# Patient Record
Sex: FEMALE | Race: WHITE
[De-identification: names, ages, dates, MRNs, and addresses within clinical notes are randomized per-mention and may not be internally consistent; named-entity substitution may affect disease eponyms.]

---

## 2023-07-19 ENCOUNTER — HOSPITAL ENCOUNTER
Dept: HOSPITAL 101 - RAD | Age: 68
Discharge: HOME | End: 2023-07-19
Payer: MEDICARE

## 2023-07-19 DIAGNOSIS — S92.351D: Primary | ICD-10-CM

## 2023-07-19 PROCEDURE — 73630 X-RAY EXAM OF FOOT: CPT

## 2023-07-25 ENCOUNTER — HOSPITAL ENCOUNTER
Dept: HOSPITAL 101 - MRI | Age: 68
Discharge: HOME | End: 2023-07-25
Payer: MEDICARE

## 2023-07-25 DIAGNOSIS — M76.71: Primary | ICD-10-CM

## 2023-07-25 DIAGNOSIS — M25.9: ICD-10-CM
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
## 2023-07-25 DIAGNOSIS — M76.821: ICD-10-CM

## 2023-07-25 DIAGNOSIS — S93.421A: ICD-10-CM

## 2023-07-25 DIAGNOSIS — S93.431A: ICD-10-CM

## 2023-07-25 DIAGNOSIS — Z96.7: ICD-10-CM

## 2023-07-25 PROCEDURE — 73721 MRI JNT OF LWR EXTRE W/O DYE: CPT

## 2023-08-16 ENCOUNTER — HOSPITAL ENCOUNTER (OUTPATIENT)
Dept: HOSPITAL 101 - PT | Age: 68
LOS: 44 days | Discharge: HOME | End: 2023-09-29
Payer: MEDICARE

## 2023-08-16 DIAGNOSIS — M25.571: ICD-10-CM

## 2023-08-16 DIAGNOSIS — S92.351D: Primary | ICD-10-CM

## 2023-08-16 PROCEDURE — 97010 HOT OR COLD PACKS THERAPY: CPT

## 2023-08-16 PROCEDURE — G0283 ELEC STIM OTHER THAN WOUND: HCPCS

## 2023-08-16 PROCEDURE — 97530 THERAPEUTIC ACTIVITIES: CPT

## 2023-08-16 PROCEDURE — 97112 NEUROMUSCULAR REEDUCATION: CPT

## 2023-08-16 PROCEDURE — 97110 THERAPEUTIC EXERCISES: CPT

## 2023-08-16 PROCEDURE — 97140 MANUAL THERAPY 1/> REGIONS: CPT

## 2023-08-16 PROCEDURE — 97162 PT EVAL MOD COMPLEX 30 MIN: CPT

## 2023-10-08 ENCOUNTER — HOSPITAL ENCOUNTER (EMERGENCY)
Age: 68
Discharge: HOME | End: 2023-10-08
Payer: MEDICARE

## 2023-10-08 VITALS — BODY MASS INDEX: 20.3 KG/M2

## 2023-10-08 VITALS
TEMPERATURE: 98.06 F | DIASTOLIC BLOOD PRESSURE: 84 MMHG | RESPIRATION RATE: 18 BRPM | HEART RATE: 90 BPM | OXYGEN SATURATION: 99 % | SYSTOLIC BLOOD PRESSURE: 148 MMHG

## 2023-10-08 DIAGNOSIS — Z79.899: ICD-10-CM

## 2023-10-08 DIAGNOSIS — T46.5X5A: ICD-10-CM

## 2023-10-08 DIAGNOSIS — R05.3: Primary | ICD-10-CM

## 2023-10-08 PROCEDURE — 71045 X-RAY EXAM CHEST 1 VIEW: CPT

## 2023-10-08 PROCEDURE — 99284 EMERGENCY DEPT VISIT MOD MDM: CPT

## 2023-10-08 PROCEDURE — 96372 THER/PROPH/DIAG INJ SC/IM: CPT

## 2023-11-15 ENCOUNTER — HOSPITAL ENCOUNTER
Dept: HOSPITAL 101 - MAMMO | Age: 68
Discharge: HOME | End: 2023-11-15
Payer: MEDICARE

## 2023-11-15 DIAGNOSIS — I10: ICD-10-CM

## 2023-11-15 DIAGNOSIS — Z80.8: ICD-10-CM

## 2023-11-15 DIAGNOSIS — Z12.31: Primary | ICD-10-CM

## 2023-11-15 DIAGNOSIS — Z80.3: ICD-10-CM

## 2023-11-15 DIAGNOSIS — Z78.0: ICD-10-CM

## 2023-11-15 LAB
ADD MANUAL DIFF: NO
ALANINE AMINOTRANSFERASE: 22 U/L (ref 14–59)
ANION GAP: 11.9
BLOOD UREA NITROGEN: 19 MG/DL (ref 7–18)
CALCIUM: 9 MG/DL (ref 8.5–10.1)
CARBON DIOXIDE: 30.7 MMOL/L (ref 21–32)
CHLORIDE: 104 MMOL/L (ref 98–107)
CHOLESTEROL: 169 MG/DL (ref ?–200)
CO2 BLD-SCNC: 30.7 MMOL/L (ref 21–32)
ESTIMATED GFR (AFRICAN AMERICA: >60 (ref 60–?)
ESTIMATED GFR (NON-AFRICAN AME: >60 (ref 60–?)
GLUCOSE BLD-MCNC: 87 MG/DL (ref 74–106)
HCT VFR BLD CALC: 32.9 % (ref 36–48)
HDL CHOLESTEROL: 60 MG/DL (ref 40–60)
HEMATOCRIT: 32.9 % (ref 36–48)
HEMOGLOBIN: 10 G/DL (ref 12–16)
IMMATURE GRANULOCYTES ABS AUTO: 0.03 10^3/UL (ref 0–0.03)
IMMATURE GRANULOCYTES PCT AUTO: 0.4 % (ref 0–0.5)
LYMPHOCYTES  ABSOLUTE AUTO: 2 10^3/UL (ref 1.2–3.8)
MCV RBC: 83.1 FL (ref 81–99)
MEAN CORPUSCULAR HEMOGLOBIN: 25.3 PG (ref 26.7–34)
MEAN CORPUSCULAR HGB CONC: 30.4 G/DL (ref 29.9–35.2)
MEAN CORPUSCULAR VOLUME: 83.1 FL (ref 81–99)
PLATELET # BLD: 383 10^3/UL (ref 150–450)
PLATELET COUNT: 383 10^3/UL (ref 150–450)
POTASSIUM SERPLBLD-SCNC: 3.6 MMOL/L (ref 3.5–5.1)
POTASSIUM: 3.6 MMOL/L (ref 3.5–5.1)
RED BLOOD COUNT: 3.96 10^6/UL (ref 4.2–5.4)
SODIUM BLD-SCNC: 143 MMOL/L (ref 136–145)
SODIUM: 143 MMOL/L (ref 136–145)
THYROID STIMULATING HORMONE: 1.12 UIU/ML (ref 0.36–3.74)
TRIGLYCERIDES: 103 MG/DL (ref ?–150)
VLDL CHOLESTEROL: 20.6 MG/DL
WBC # BLD: 8.3 10^3/UL (ref 4–11)
WHITE BLOOD COUNT: 8.3 10^3/UL (ref 4–11)

## 2023-11-15 PROCEDURE — 84443 ASSAY THYROID STIM HORMONE: CPT

## 2023-11-15 PROCEDURE — 84460 ALANINE AMINO (ALT) (SGPT): CPT

## 2023-11-15 PROCEDURE — 77067 SCR MAMMO BI INCL CAD: CPT

## 2023-11-15 PROCEDURE — 80048 BASIC METABOLIC PNL TOTAL CA: CPT

## 2023-11-15 PROCEDURE — 82306 VITAMIN D 25 HYDROXY: CPT

## 2023-11-15 PROCEDURE — 77063 BREAST TOMOSYNTHESIS BI: CPT

## 2023-11-15 PROCEDURE — 85025 COMPLETE CBC W/AUTO DIFF WBC: CPT

## 2023-11-15 PROCEDURE — 80061 LIPID PANEL: CPT

## 2023-11-15 PROCEDURE — 36415 COLL VENOUS BLD VENIPUNCTURE: CPT

## 2023-11-21 ENCOUNTER — HOSPITAL ENCOUNTER
Dept: HOSPITAL 101 - RAD | Age: 68
Discharge: HOME | End: 2023-11-21
Payer: MEDICARE

## 2023-11-21 DIAGNOSIS — M85.80: ICD-10-CM

## 2023-11-21 DIAGNOSIS — Z78.0: Primary | ICD-10-CM

## 2023-11-21 PROCEDURE — 77080 DXA BONE DENSITY AXIAL: CPT

## 2023-12-22 ENCOUNTER — HOSPITAL ENCOUNTER
Dept: HOSPITAL 101 - LAB | Age: 68
Discharge: HOME | End: 2023-12-22
Payer: MEDICARE

## 2023-12-22 DIAGNOSIS — D64.9: Primary | ICD-10-CM

## 2023-12-22 LAB
ADD MANUAL DIFF: NO
FERRITIN: 11 NG/ML (ref 8–252)
FOLATE: 25.3 NG/ML (ref 8.6–58.9)
HCT VFR BLD CALC: 32.9 % (ref 36–48)
HEMATOCRIT: 32.9 % (ref 36–48)
HEMOGLOBIN: 9.9 G/DL (ref 12–16)
IMMATURE GRANULOCYTES ABS AUTO: 0.02 10^3/UL (ref 0–0.03)
IMMATURE GRANULOCYTES PCT AUTO: 0.3 % (ref 0–0.5)
IRON: 39 UG/DL (ref 50–170)
LYMPHOCYTES  ABSOLUTE AUTO: 1.9 10^3/UL (ref 1.2–3.8)
MCV RBC: 82.3 FL (ref 81–99)
MEAN CORPUSCULAR HEMOGLOBIN: 24.8 PG (ref 26.7–34)
MEAN CORPUSCULAR HGB CONC: 30.1 G/DL (ref 29.9–35.2)
MEAN CORPUSCULAR VOLUME: 82.3 FL (ref 81–99)
PERCENT IRON SATURATION: 9.1 %
PLATELET # BLD: 376 10^3/UL (ref 150–450)
PLATELET COUNT: 376 10^3/UL (ref 150–450)
RED BLOOD COUNT: 4 10^6/UL (ref 4.2–5.4)
TOTAL IRON BINDING CAPACITY: 429 UG/DL (ref 250–450)
VITAMIN B12: 1305 PG/ML (ref 193–986)
WBC # BLD: 7.6 10^3/UL (ref 4–11)
WHITE BLOOD COUNT: 7.6 10^3/UL (ref 4–11)

## 2023-12-22 PROCEDURE — 36415 COLL VENOUS BLD VENIPUNCTURE: CPT

## 2023-12-22 PROCEDURE — 82607 VITAMIN B-12: CPT

## 2023-12-22 PROCEDURE — 82728 ASSAY OF FERRITIN: CPT

## 2023-12-22 PROCEDURE — 83550 IRON BINDING TEST: CPT

## 2023-12-22 PROCEDURE — 83540 ASSAY OF IRON: CPT

## 2023-12-22 PROCEDURE — 85025 COMPLETE CBC W/AUTO DIFF WBC: CPT

## 2023-12-22 PROCEDURE — 82746 ASSAY OF FOLIC ACID SERUM: CPT

## 2023-12-22 PROCEDURE — 85045 AUTOMATED RETICULOCYTE COUNT: CPT

## 2024-01-25 ENCOUNTER — HOSPITAL ENCOUNTER
Dept: HOSPITAL 101 - LAB | Age: 69
Discharge: HOME | End: 2024-01-25
Payer: MEDICARE

## 2024-01-25 DIAGNOSIS — D50.0: Primary | ICD-10-CM

## 2024-01-25 LAB
ADD MANUAL DIFF: NO
FERRITIN: 14 NG/ML (ref 8–252)
HCT VFR BLD CALC: 32.1 % (ref 36–48)
HEMATOCRIT: 32.1 % (ref 36–48)
HEMOGLOBIN: 9.5 G/DL (ref 12–16)
IMMATURE GRANULOCYTES ABS AUTO: 0.02 10^3/UL (ref 0–0.03)
IMMATURE GRANULOCYTES PCT AUTO: 0.2 % (ref 0–0.5)
LYMPHOCYTES  ABSOLUTE AUTO: 2 10^3/UL (ref 1.2–3.8)
MCV RBC: 82.1 FL (ref 81–99)
MEAN CORPUSCULAR HEMOGLOBIN: 24.3 PG (ref 26.7–34)
MEAN CORPUSCULAR HGB CONC: 29.6 G/DL (ref 29.9–35.2)
MEAN CORPUSCULAR VOLUME: 82.1 FL (ref 81–99)
PLATELET # BLD: 357 10^3/UL (ref 150–450)
PLATELET COUNT: 357 10^3/UL (ref 150–450)
RED BLOOD COUNT: 3.91 10^6/UL (ref 4.2–5.4)
WBC # BLD: 9.2 10^3/UL (ref 4–11)
WHITE BLOOD COUNT: 9.2 10^3/UL (ref 4–11)

## 2024-01-25 PROCEDURE — 85025 COMPLETE CBC W/AUTO DIFF WBC: CPT

## 2024-01-25 PROCEDURE — 82728 ASSAY OF FERRITIN: CPT

## 2024-01-25 PROCEDURE — 36415 COLL VENOUS BLD VENIPUNCTURE: CPT

## 2024-02-14 VITALS
OXYGEN SATURATION: 99 % | HEART RATE: 92 BPM | SYSTOLIC BLOOD PRESSURE: 132 MMHG | RESPIRATION RATE: 16 BRPM | DIASTOLIC BLOOD PRESSURE: 78 MMHG | TEMPERATURE: 96.98 F

## 2024-02-19 LAB — RETICULOCYTE COUNT: 1.25 % (ref 0.6–3.1)

## 2024-02-20 ENCOUNTER — HOSPITAL ENCOUNTER
Dept: HOSPITAL 101 - CT | Age: 69
Discharge: HOME | End: 2024-02-20
Payer: MEDICARE

## 2024-02-20 DIAGNOSIS — R06.1: Primary | ICD-10-CM

## 2024-02-20 DIAGNOSIS — J45.41: ICD-10-CM

## 2024-02-20 PROCEDURE — 94010 BREATHING CAPACITY TEST: CPT

## 2024-02-20 PROCEDURE — 94729 DIFFUSING CAPACITY: CPT

## 2024-02-20 PROCEDURE — 70490 CT SOFT TISSUE NECK W/O DYE: CPT

## 2024-02-20 PROCEDURE — 94726 PLETHYSMOGRAPHY LUNG VOLUMES: CPT

## 2024-02-20 PROCEDURE — 71250 CT THORAX DX C-: CPT

## 2024-02-21 ENCOUNTER — HOSPITAL ENCOUNTER (OUTPATIENT)
Dept: HOSPITAL 101 - INF | Age: 69
LOS: 8 days | Discharge: HOME | End: 2024-02-29
Payer: MEDICARE

## 2024-02-21 VITALS
TEMPERATURE: 97.88 F | DIASTOLIC BLOOD PRESSURE: 74 MMHG | SYSTOLIC BLOOD PRESSURE: 116 MMHG | RESPIRATION RATE: 16 BRPM | OXYGEN SATURATION: 95 % | HEART RATE: 90 BPM

## 2024-02-21 DIAGNOSIS — D50.9: ICD-10-CM

## 2024-02-21 DIAGNOSIS — K90.9: ICD-10-CM

## 2024-02-21 DIAGNOSIS — D64.9: Primary | ICD-10-CM

## 2024-02-21 PROCEDURE — 96365 THER/PROPH/DIAG IV INF INIT: CPT

## 2024-02-21 PROCEDURE — G0463 HOSPITAL OUTPT CLINIC VISIT: HCPCS

## 2024-03-04 ENCOUNTER — HOSPITAL ENCOUNTER
Dept: HOSPITAL 101 - CT | Age: 69
Discharge: HOME | End: 2024-03-04
Payer: MEDICARE

## 2024-03-04 DIAGNOSIS — R10.32: Primary | ICD-10-CM

## 2024-03-04 DIAGNOSIS — K57.90: ICD-10-CM

## 2024-03-04 PROCEDURE — 74176 CT ABD & PELVIS W/O CONTRAST: CPT

## 2024-04-11 ENCOUNTER — HOSPITAL ENCOUNTER
Dept: HOSPITAL 101 - LAB | Age: 69
Discharge: HOME | End: 2024-04-11
Payer: MEDICARE

## 2024-04-11 DIAGNOSIS — D50.9: ICD-10-CM

## 2024-04-11 DIAGNOSIS — K90.9: ICD-10-CM

## 2024-04-11 DIAGNOSIS — D64.9: Primary | ICD-10-CM

## 2024-04-11 LAB
ADD MANUAL DIFF: NO
FERRITIN: 110 NG/ML (ref 8–252)
HEMATOCRIT: 36.9 % (ref 36–48)
HEMOGLOBIN: 12 G/DL (ref 12–16)
IMMATURE GRANULOCYTES ABS AUTO: 0.17 10^3/UL (ref 0–0.03)
IMMATURE GRANULOCYTES PCT AUTO: 1.4 % (ref 0–0.5)
IRON: 58 UG/DL (ref 50–170)
LYMPHOCYTES  ABSOLUTE AUTO: 2.2 10^3/UL (ref 1.2–3.8)
MCV RBC: 87.9 FL (ref 81–99)
MEAN CORPUSCULAR HEMOGLOBIN: 28.6 PG (ref 26.7–34)
MEAN CORPUSCULAR HGB CONC: 32.5 G/DL (ref 29.9–35.2)
PERCENT IRON SATURATION: 20.8 %
PLATELET COUNT: 378 10^3/UL (ref 150–450)
RED BLOOD COUNT: 4.2 10^6/UL (ref 4.2–5.4)
TOTAL IRON BINDING CAPACITY: 279 UG/DL (ref 250–450)
WHITE BLOOD COUNT: 11.8 10^3/UL (ref 4–11)

## 2024-04-11 PROCEDURE — 82728 ASSAY OF FERRITIN: CPT

## 2024-04-11 PROCEDURE — 83550 IRON BINDING TEST: CPT

## 2024-04-11 PROCEDURE — 36415 COLL VENOUS BLD VENIPUNCTURE: CPT

## 2024-04-11 PROCEDURE — 83540 ASSAY OF IRON: CPT

## 2024-04-11 PROCEDURE — 85025 COMPLETE CBC W/AUTO DIFF WBC: CPT

## 2024-04-16 ENCOUNTER — HOSPITAL ENCOUNTER (OUTPATIENT)
Dept: HOSPITAL 101 - INF | Age: 69
LOS: 14 days | Discharge: HOME | End: 2024-04-30
Payer: MEDICARE

## 2024-04-16 DIAGNOSIS — K90.9: ICD-10-CM

## 2024-04-16 DIAGNOSIS — D50.9: Primary | ICD-10-CM

## 2024-04-16 DIAGNOSIS — D64.9: ICD-10-CM

## 2024-04-16 PROCEDURE — 86334 IMMUNOFIX E-PHORESIS SERUM: CPT

## 2024-04-16 PROCEDURE — 82785 ASSAY OF IGE: CPT

## 2024-04-16 PROCEDURE — 84155 ASSAY OF PROTEIN SERUM: CPT

## 2024-04-16 PROCEDURE — 84165 PROTEIN E-PHORESIS SERUM: CPT

## 2024-04-16 PROCEDURE — 36415 COLL VENOUS BLD VENIPUNCTURE: CPT

## 2024-04-16 PROCEDURE — G0463 HOSPITAL OUTPT CLINIC VISIT: HCPCS

## 2024-04-16 PROCEDURE — 83521 IG LIGHT CHAINS FREE EACH: CPT

## 2024-04-16 PROCEDURE — 82784 ASSAY IGA/IGD/IGG/IGM EACH: CPT

## 2024-04-17 LAB
ALBUMIN: 3.6 G/DL (ref 2.9–4.4)
ALPHA-1-GLOBULIN: 0.2 G/DL (ref 0–0.4)
ALPHA-2-GLOBULIN: 0.9 G/DL (ref 0.4–1)
FREE KAPPA LT CHAINS,S: 12.1 MG/L (ref 3.3–19.4)
FREE LAMBDA LT CHAINS,S: 10.7 MG/L (ref 5.7–26.3)
GAMMA GLOBULIN: 0.5 G/DL (ref 0.4–1.8)
IMMUNOGLOBULIN A, QN, SERUM: 83 MG/DL (ref 87–352)
KAPPA/LAMBDA RATIO,S: 1.13 (ref 0.26–1.65)

## 2024-05-08 ENCOUNTER — HOSPITAL ENCOUNTER
Dept: HOSPITAL 101 - EC | Age: 69
Discharge: HOME | End: 2024-05-08
Payer: MEDICARE

## 2024-05-08 DIAGNOSIS — M79.671: Primary | ICD-10-CM

## 2024-05-08 DIAGNOSIS — Z98.890: ICD-10-CM

## 2024-05-08 PROCEDURE — 73630 X-RAY EXAM OF FOOT: CPT

## 2024-06-29 ENCOUNTER — APPOINTMENT (OUTPATIENT)
Dept: OTOLARYNGOLOGY | Facility: CLINIC | Age: 69
End: 2024-06-29
Payer: MEDICARE

## 2024-06-29 VITALS — WEIGHT: 162.1 LBS | HEIGHT: 63 IN | BODY MASS INDEX: 28.72 KG/M2

## 2024-06-29 DIAGNOSIS — J32.2 CHRONIC ETHMOIDITIS: ICD-10-CM

## 2024-06-29 DIAGNOSIS — R05.3 CHRONIC COUGH: ICD-10-CM

## 2024-06-29 DIAGNOSIS — J45.909 ASTHMA, UNSPECIFIED ASTHMA SEVERITY, UNSPECIFIED WHETHER COMPLICATED, UNSPECIFIED WHETHER PERSISTENT (HHS-HCC): ICD-10-CM

## 2024-06-29 DIAGNOSIS — J32.0 CHRONIC MAXILLARY SINUSITIS: Primary | ICD-10-CM

## 2024-06-29 PROCEDURE — 1159F MED LIST DOCD IN RCRD: CPT | Performed by: OTOLARYNGOLOGY

## 2024-06-29 PROCEDURE — 99205 OFFICE O/P NEW HI 60 MIN: CPT | Performed by: OTOLARYNGOLOGY

## 2024-06-29 PROCEDURE — 1126F AMNT PAIN NOTED NONE PRSNT: CPT | Performed by: OTOLARYNGOLOGY

## 2024-06-29 PROCEDURE — 1160F RVW MEDS BY RX/DR IN RCRD: CPT | Performed by: OTOLARYNGOLOGY

## 2024-06-29 PROCEDURE — 31231 NASAL ENDOSCOPY DX: CPT | Performed by: OTOLARYNGOLOGY

## 2024-06-29 PROCEDURE — 1036F TOBACCO NON-USER: CPT | Performed by: OTOLARYNGOLOGY

## 2024-06-29 RX ORDER — ATORVASTATIN CALCIUM 10 MG/1
TABLET, FILM COATED ORAL
COMMUNITY

## 2024-06-29 RX ORDER — CETIRIZINE HYDROCHLORIDE 10 MG/1
TABLET ORAL
COMMUNITY

## 2024-06-29 RX ORDER — ESTRADIOL 0.1 MG/G
CREAM VAGINAL
COMMUNITY
Start: 2023-12-06

## 2024-06-29 RX ORDER — FLUTICASONE FUROATE AND VILANTEROL TRIFENATATE 200; 25 UG/1; UG/1
POWDER RESPIRATORY (INHALATION)
COMMUNITY
Start: 2023-11-10

## 2024-06-29 RX ORDER — PANTOPRAZOLE SODIUM 40 MG/1
40 TABLET, DELAYED RELEASE ORAL
COMMUNITY

## 2024-06-29 RX ORDER — ALENDRONATE SODIUM 70 MG/1
TABLET ORAL
COMMUNITY

## 2024-06-29 RX ORDER — BUTALBITAL, ACETAMINOPHEN AND CAFFEINE 300; 40; 50 MG/1; MG/1; MG/1
CAPSULE ORAL
COMMUNITY

## 2024-06-29 RX ORDER — AMLODIPINE BESYLATE 5 MG/1
TABLET ORAL
COMMUNITY
Start: 2023-12-06

## 2024-06-29 RX ORDER — LEVOTHYROXINE SODIUM 100 UG/1
TABLET ORAL EVERY 24 HOURS
COMMUNITY

## 2024-06-29 RX ORDER — ASPIRIN 81 MG/1
TABLET ORAL EVERY 24 HOURS
COMMUNITY

## 2024-06-29 RX ORDER — NITROFURANTOIN MONOHYDRATE/MACROCRYSTALLINE 25; 75 MG/1; MG/1
100 CAPSULE ORAL
COMMUNITY
Start: 2022-09-16

## 2024-06-29 RX ORDER — FAMOTIDINE 20 MG/1
TABLET, FILM COATED ORAL
COMMUNITY
Start: 2023-10-08

## 2024-06-29 RX ORDER — CODEINE PHOSPHATE AND GUAIFENESIN 10; 100 MG/5ML; MG/5ML
SOLUTION ORAL
COMMUNITY
Start: 2024-04-05

## 2024-06-29 RX ORDER — EPINEPHRINE 0.3 MG/.3ML
INJECTION INTRAMUSCULAR
COMMUNITY

## 2024-06-29 RX ORDER — FLUTICASONE PROPIONATE 93 UG/1
SPRAY, METERED NASAL
COMMUNITY

## 2024-06-29 RX ORDER — MONTELUKAST SODIUM 10 MG/1
TABLET ORAL
COMMUNITY
Start: 2024-03-07

## 2024-06-29 RX ORDER — ALBUTEROL SULFATE 90 UG/1
2 AEROSOL, METERED RESPIRATORY (INHALATION)
COMMUNITY

## 2024-06-29 RX ORDER — BETAMETHASONE VALERATE 1.2 MG/G
OINTMENT TOPICAL
COMMUNITY
Start: 2024-06-24

## 2024-06-29 RX ORDER — ACETAMINOPHEN 500 MG
50 TABLET ORAL
COMMUNITY
Start: 2023-03-03

## 2024-06-29 RX ORDER — METRONIDAZOLE 7.5 MG/G
1 GEL VAGINAL
COMMUNITY

## 2024-06-29 RX ORDER — POLYSACCHARIDE-IRON COMPLEX 150 MG/1
CAPSULE ORAL
COMMUNITY
Start: 2023-12-22

## 2024-06-29 ASSESSMENT — PATIENT HEALTH QUESTIONNAIRE - PHQ9
1. LITTLE INTEREST OR PLEASURE IN DOING THINGS: NOT AT ALL
SUM OF ALL RESPONSES TO PHQ9 QUESTIONS 1 & 2: 0
2. FEELING DOWN, DEPRESSED OR HOPELESS: NOT AT ALL

## 2024-06-29 ASSESSMENT — PAIN SCALES - GENERAL: PAINLEVEL: 0-NO PAIN

## 2024-06-29 NOTE — LETTER
June 29, 2024     Jenise Lagunas MD  [address] [address]    Patient: Yamileth Rolon   YOB: 1955   Date of Visit: 6/29/2024       Dear Dr. Jenise Lagunas MD:    Thank you for referring Yamileth Rolon to me for evaluation. Below are my notes for this consultation.  If you have questions, please do not hesitate to call me. I look forward to following your patient along with you.       Sincerely,     Mk Mccray MD      CC: No Recipients  ______________________________________________________________________________________    Chief Complaint:  Cough    History Of Present Illness:    Yamileth Rolon presents as a new patient to me.    She developed a cough in October 2023.  Prior to this she was doing well.  She was evaluated by a number of providers and has been given multiple courses of oral medical therapy including doxycycline and prednisone.     She was transitioned from [facility] to [facility] without significant benefit.  When she was assessed by pulmonology, it was not felt that her asthma was driving her worsening of cough.  She has been told by different provider that she was wheezing from her neck and we discussed what I think was meant by this today.  No matter what she did the cough persisted.  Ultimately, she was given a codeine-based cough medication that would allow her to sleep.  More recently, over the last few weeks, she was prescribed Xhance and feels that this is provided benefit but she continues to cough on some level.    She had a very bad sinus infection in early June 2024.  She was evaluated through an urgent care and given doxycycline.  She had new onset production of significant mucoid debris that drained from the back of her nose into her mouth and this was discolored.  This happened a few times but since this resolved she has done generally well.    Main Symptoms:  Patient has anterior nasal drainage.   Once every 4 days  since Oct 2023   Patient does not have  posterior nasal drainage.  Previously   Patient does not have nasal airway obstruction.   Patient has  facial pain.  Was bad during the infection in June 2024 but now ok  Patient has  facial pressure.  Was bad during the infection in June 2024 but now ok  Patient does not have decreased sense of smell.   Associated Symptoms:   Patient has headaches.    Patient has throat clearing.  75%-80% better now  Patient has coughing.  75%-80% better now  Patient does not have dysphonia.  Normal now but did have issues   Patient does not have nasal bleeding.     Medications currently on for sinonasal symptoms:   Xhance 1 puff each side Qday (has been on for weeks), Zyrtec, Breo; rinses BID  Medications tried in the past for sinonasal symptoms:   Astepro (no benefit), Claritin    Other Pertinent Medical Conditions:   Patient has asthma.   Follows with pulmonary (Had PFT's in February that was NL by her report)   Patient does not have aspirin sensitivity.    Patient has migraines.    Patient has history of allergy testing. When: Feb 2024 (unable to directly review the results) Patient does not have history of IT.  Kenalog shots will months for > 10 years age 7 -early 20's for atopic dermatitis  Patient does not have history of sinus surgery.    Patient does not have history of nasal fracture.    Patient has heartburn.    The patient does take therapy for heartburn. Pepcid, Protonix   The patient has a GI evaluation.    The patient has imaging of sinuses. When: 5/9/24.  I personally reviewed the CT sinus today during the patient's clinic visit and there were scattered inflammatory changes throughout her sinuses.    Findings:   Maxillary Sinuses: Mild mucosal thickening.   Ethmoid sinuses: Moderate mucosal thickening.   Sphenoid sinuses: Mild mucosal thickening. Sphenoid sinus pneumatization that extends posteriorly to the anterior margin of the sella, compatible with presellar type  pneumatization.   Frontal sinuses: Mild mucosal thickening.   Right sphenoethmoidal recess: Opacified   Left sphenoethmoidal recess: Opacified   Right ostiomeatal complex: Clear. Right frontal recess: Opacified   Left ostiomeatal complex: Clear. Left frontal recess: Opacified   Nasal septum: Minimal leftward deviation with spurring.   Other: Keros type 2 olfactory fossa. No Onodi or Veda cells.   Mastoid air cells & middle ears: Clear. The middle ears are unremarkable.   Soft tissues & Brain: No acute abnormality identified. Orbital contents are within   normal limits.     IgE level February 5, 2024 was 439    Active Problems:  There is no problem list on file for this patient.     Past Medical History:  She has no past medical history on file.    Surgical History:  She has no past surgical history on file.     Family History:  No family history on file.    Social History:  She reports that she has never smoked. She has never used smokeless tobacco. She reports that she does not drink alcohol and does not use drugs.     Allergies:  Bee venom protein (honey bee), Iodinated contrast media, Iodine, Wasp venom, Amoxicillin, Sulfacetamide sod-sulfur-urea, Penicillin, and Sulfa (sulfonamide antibiotics)    Current Meds:    Current Outpatient Medications:   •  amLODIPine (Norvasc) 5 mg tablet, Daily, Disp: , Rfl:   •  betamethasone valerate (Valisone) 0.1 % ointment, , Disp: , Rfl:   •  Breo Ellipta 200-25 mcg/dose inhaler, , Disp: , Rfl:   •  cholecalciferol (Vitamin D-3) 50 mcg (2,000 unit) capsule, Take 1 capsule (50 mcg) by mouth once daily., Disp: , Rfl:   •  codeine-guaifenesin (Robitussin-AC)  mg/5 mL syrup, Every 6 hours, Disp: , Rfl:   •  estradiol (Estrace) 0.01 % (0.1 mg/gram) vaginal cream, See Instructions, 42.5 gm, Refill(s) 0, apply pea sized amount to urethra 2x/wk, [facility], cm, 12/06/23 8:48:00 EST, Height/Length Dosing, 68.5, kg, 12/06/23 8:48:00 EST, Weight Dosing,  "Disp: , Rfl:   •  famotidine (Pepcid) 20 mg tablet, Take by mouth., Disp: , Rfl:   •  iFerex 150 150 mg iron capsule, TAKE 1 CAPSULE BY MOUTH EVERY OTHER DAY Oral for 30 Days, Disp: , Rfl:   •  Macrobid 100 mg capsule, Take 1 capsule (100 mg) by mouth., Disp: , Rfl:   •  montelukast (Singulair) 10 mg tablet, Daily, Disp: , Rfl:   •  albuterol 90 mcg/actuation inhaler, Inhale 2 puffs., Disp: , Rfl:   •  aspirin 81 mg EC tablet, once every 24 hours., Disp: , Rfl:   •  atorvastatin (Lipitor) 10 mg tablet, 1 (one) time each day at the same time, Disp: , Rfl:   •  butalbital-acetaminophen-caff (Fioricet) -40 mg capsule, Take by mouth., Disp: , Rfl:   •  cetirizine (ZyrTEC) 10 mg tablet, take 1 tablet by mouth once daily if needed for allergies, Disp: , Rfl:   •  EpiPen 2-Damir 0.3 mg/0.3 mL injection syringe, as directed Injection, Disp: , Rfl:   •  fluticasone propionate (Xhance) 93 mcg/actuation aerosol breath activated, Administer into affected nostril(s)., Disp: , Rfl:   •  Fosamax 70 mg tablet, 1 tablet 30 minutes before the first food, beverage or medicine of the day with plain water Orally for 30 day(s), Disp: , Rfl:   •  levothyroxine (Synthroid, Levoxyl) 100 mcg tablet, once every 24 hours., Disp: , Rfl:   •  metroNIDAZOLE (Metrogel) 0.75 % (37.5mg/5 gram) vaginal gel, Insert 1 Applicatorful into the vagina., Disp: , Rfl:   •  pantoprazole (ProtoNix) 40 mg EC tablet, Take 1 tablet (40 mg) by mouth., Disp: , Rfl:     Vitals:  Visit Vitals  Ht 1.6 m (5' 3\")   Wt 73.5 kg (162 lb 1.6 oz)   BMI 28.71 kg/m²   Smoking Status Never   BSA 1.81 m²      Physical Exam:  CONSTITUTIONAL:  Vitals reviewed in nursing chart, well developed, well nourished.    RESPIRATION:  Breathing comfortably, no stridor.  CV:  No clubbing/cyanosis/edema in hands.  EYES:  EOM Intact, sclera normal.  NEURO:  Alert and oriented times 3, Cranial nerves 2-12 intact and symmetric bilaterally.  HEAD AND FACE:  Skin with no masses or lesions, " sinuses nontender to palpation.  SALIVARY GLANDS:  Parotid and submandibular glands normal bilaterally.  EARS:  Normal external ears, external auditory canals, and TMs to otoscopy, normal hearing to whispered voice.  NOSE:  External nose midline, anterior rhinoscopy is normal with limited visualization to the anterior aspect of the interior turbinates (see nasal endoscopy).  ORAL CAVITY/OROPHARYNX/LIPS:  Normal mucous membranes, normal floor of mouth/tongue/OP, no masses or lesions are noted.  NECK/LYMPH:  No LAD, no thyroid masses.    SINONASAL ENDOSCOPY (CPT 01205): To better evaluate the patient's symptoms, sinonasal endoscopy is indicated.  After discussion of risks and benefits, and topical decongestion and anesthesia,an endoscope was used to perform nasal endoscopy on each side.  A time out identifying the patient, the procedure, the location of the procedure and any concerns was performed prior to beginning the procedure.    Findings:  Examination of the right nasal cavity revealed some mucus draining from the right middle meatus.  Sphenoethmoid recess was normal.  Examination of the left nasal cavity revealed a very small amount of mucus draining from the middle meatus.  Sphenoethmoid recess was normal.    Results/Data:  I personally reviewed her CT sinus as listed above.    I reviewed an office note from Dr. Hays May 29, 2024.  This note outlined her significant medical therapy including multiple courses of prednisone as well as doxycycline.  She was prescribed Xhance.  I reviewed another note from April 17, 2024.  She was on Breo at the time and they had discussed the possibility of a biologic if her symptoms continue.  I reviewed another note from March 13, 2024 at which time she was given doxycycline and asked to begin dextromethorphan over-the-counter.    I personally reviewed the last note from Dr. Lagunas May 15, 2024.  She underwent a CT sinus before this evaluation and he did mention that her  cough had been improving at that time.  She was asked to see me given that she is already been evaluated by pulmonology, GI, allergy for her cough.  I reviewed a note from April 16, 2024.  During that evaluation her larynx was visualized and there were no concerning findings.  At that time, they discussed some wheezing in her neck and coughing that she was describing.  I reviewed another note from February 29, 2024.  At that time it was mentioned that she had an elevated eosinophil count.  I reviewed a note from February 5, 2024 during which they discussed that she had a wheeze in her neck and that it could be an extrathoracic cause.  A CT neck was recommended and they discussed getting spirometry.  The CT neck was not available for review but according to the note from February 29 this was normal outside of sinus disease.  According to the same note, she was evaluated through pulmonary without specific findings.    I reviewed the report of a modified barium swallow from December 9, 2021.    IMPRESSION:   1. Normal modified barium swallowing study.   2. Given the normal modified barium swallowing study the patient's symptoms may be secondary to gastroesophageal reflux or abnormal/delayed esophageal peristalsis. Consider a fluoroscopic esophagram study for further evaluation if symptoms persist.     Provider Impressions:  1.  Chronic sinusitis  2.  Throat clearing, coughing  3.  Rhinorrhea  4.  Headaches, migraines  5.  Reflux on proton pump inhibitor and H2 blocker  6.  Eosinophilia    Discussion:  Yamileth Rolon and I discussed her exam and symptoms together today.  We had a very comprehensive discussion about whether or not the findings on her imaging were the most significant  of her ongoing throat clearing and coughing.  At this point in time, I would hold off on consideration of endonasal surgery and we discussed a number of reasons for this today.    I recommend that she continue Xhance.  She seems  to be having improvement on this and I would not want to alter this therapy currently.  She has used Astepro without significant benefit.  If we were going to make alterations to her intranasal medical therapy I would recommend a rinse based steroid such as mometasone 2 mg.    We discussed extrathoracic causes of noisy breathing.  We also discussed some events that she would have with coughing where she would feel like she could not breathe and turned blue.  I question whether or not she has paradoxical vocal fold motion as this would explain some of the symptoms.  We discussed some basics of this entity and I strongly recommended evaluation with one of my voice partners to be evaluated.  If this is not specifically seen on exam, I am hopeful that speech-language pathology can discuss options with her in regard to medical management.    She has been evaluated by pulmonary and follows with GI.  If she meets with my voice partner and has ongoing symptoms I think it would be completely reasonable to consider surgical options.  We discussed risks of sinus surgery today.  Our focus was on the potential for persistence of symptoms if her cough is not being driven by a primary sinonasal issue particularly the fact that she denies recent postnasal drainage yet continues to cough.    The sinus surgery itself was discussed at length.  The risks, benefits, and alternatives were explained in detail which include but is not limited to: persistence of symptoms, pain, bleeding, smell loss or alteration, infection, need for nasal packing, double vision, vision loss, CSF leak requiring other procedures to repair, numbness of teeth/and or face, need for revision surgery, and unexpected risks.      She had discussed with Dr. Hays the possibility of a biologic and this is also something that she should consider seriously as this would provide benefit both in regard to asthma as well as chronic sinusitis (particularly in those with  nasal polyposis).    I recommended follow-up with me as needed moving forward.  All questions were answered.    Between face-to-face contact, review of the medical record, and documentation I spent 65 minutes on this evaluation during the day of service.    Signature:  Mk Mccrya MD

## 2024-06-29 NOTE — PROGRESS NOTES
Chief Complaint:  Cough    History Of Present Illness:    Yamileth Rolon presents as a new patient to me.    She developed a cough in October 2023.  Prior to this she was doing well.  She was evaluated by a number of providers and has been given multiple courses of oral medical therapy including doxycycline and prednisone.     She was transitioned from [facility] to [facility] without significant benefit.  When she was assessed by pulmonology, it was not felt that her asthma was driving her worsening of cough.  She has been told by different provider that she was wheezing from her neck and we discussed what I think was meant by this today.  No matter what she did the cough persisted.  Ultimately, she was given a codeine-based cough medication that would allow her to sleep.  More recently, over the last few weeks, she was prescribed Xhance and feels that this is provided benefit but she continues to cough on some level.    She had a very bad sinus infection in early June 2024.  She was evaluated through an urgent care and given doxycycline.  She had new onset production of significant mucoid debris that drained from the back of her nose into her mouth and this was discolored.  This happened a few times but since this resolved she has done generally well.    Main Symptoms:  Patient has anterior nasal drainage.   Once every 4 days since Oct 2023   Patient does not have  posterior nasal drainage.  Previously   Patient does not have nasal airway obstruction.   Patient has  facial pain.  Was bad during the infection in June 2024 but now ok  Patient has  facial pressure.  Was bad during the infection in June 2024 but now ok  Patient does not have decreased sense of smell.   Associated Symptoms:   Patient has headaches.    Patient has throat clearing.  75%-80% better now  Patient has coughing.  75%-80% better now  Patient does not have dysphonia.  Normal now but did have issues   Patient does not have nasal bleeding.      Medications currently on for sinonasal symptoms:   Xhance 1 puff each side Qday (has been on for weeks), Zyrtec, Breo; rinses BID  Medications tried in the past for sinonasal symptoms:   Astepro (no benefit), Claritin    Other Pertinent Medical Conditions:   Patient has asthma.   Follows with pulmonary (Had PFT's in February that was NL by her report)   Patient does not have aspirin sensitivity.    Patient has migraines.    Patient has history of allergy testing. When: Feb 2024 (unable to directly review the results) Patient does not have history of IT.  Kenalog shots will months for > 10 years age 7 -early 20's for atopic dermatitis  Patient does not have history of sinus surgery.    Patient does not have history of nasal fracture.    Patient has heartburn.    The patient does take therapy for heartburn. Pepcid, Protonix   The patient has a GI evaluation.    The patient has imaging of sinuses. When: 5/9/24.  I personally reviewed the CT sinus today during the patient's clinic visit and there were scattered inflammatory changes throughout her sinuses.    Findings:   Maxillary Sinuses: Mild mucosal thickening.   Ethmoid sinuses: Moderate mucosal thickening.   Sphenoid sinuses: Mild mucosal thickening. Sphenoid sinus pneumatization that extends posteriorly to the anterior margin of the sella, compatible with presellar type pneumatization.   Frontal sinuses: Mild mucosal thickening.   Right sphenoethmoidal recess: Opacified   Left sphenoethmoidal recess: Opacified   Right ostiomeatal complex: Clear. Right frontal recess: Opacified   Left ostiomeatal complex: Clear. Left frontal recess: Opacified   Nasal septum: Minimal leftward deviation with spurring.   Other: Keros type 2 olfactory fossa. No Onodi or Veda cells.   Mastoid air cells & middle ears: Clear. The middle ears are unremarkable.   Soft tissues & Brain: No acute abnormality identified. Orbital contents are within   normal limits.     IgE level February  5, 2024 was 439    Active Problems:  There is no problem list on file for this patient.     Past Medical History:  She has no past medical history on file.    Surgical History:  She has no past surgical history on file.     Family History:  No family history on file.    Social History:  She reports that she has never smoked. She has never used smokeless tobacco. She reports that she does not drink alcohol and does not use drugs.     Allergies:  Bee venom protein (honey bee), Iodinated contrast media, Iodine, Wasp venom, Amoxicillin, Sulfacetamide sod-sulfur-urea, Penicillin, and Sulfa (sulfonamide antibiotics)    Current Meds:    Current Outpatient Medications:     amLODIPine (Norvasc) 5 mg tablet, Daily, Disp: , Rfl:     betamethasone valerate (Valisone) 0.1 % ointment, , Disp: , Rfl:     Breo Ellipta 200-25 mcg/dose inhaler, , Disp: , Rfl:     cholecalciferol (Vitamin D-3) 50 mcg (2,000 unit) capsule, Take 1 capsule (50 mcg) by mouth once daily., Disp: , Rfl:     codeine-guaifenesin (Robitussin-AC)  mg/5 mL syrup, Every 6 hours, Disp: , Rfl:     estradiol (Estrace) 0.01 % (0.1 mg/gram) vaginal cream, See Instructions, 42.5 gm, Refill(s) 0, apply pea sized amount to urethra 2x/wk, [facility], cm, 12/06/23 8:48:00 EST, Height/Length Dosing, 68.5, kg, 12/06/23 8:48:00 EST, Weight Dosing, Disp: , Rfl:     famotidine (Pepcid) 20 mg tablet, Take by mouth., Disp: , Rfl:     iFerex 150 150 mg iron capsule, TAKE 1 CAPSULE BY MOUTH EVERY OTHER DAY Oral for 30 Days, Disp: , Rfl:     Macrobid 100 mg capsule, Take 1 capsule (100 mg) by mouth., Disp: , Rfl:     montelukast (Singulair) 10 mg tablet, Daily, Disp: , Rfl:     albuterol 90 mcg/actuation inhaler, Inhale 2 puffs., Disp: , Rfl:     aspirin 81 mg EC tablet, once every 24 hours., Disp: , Rfl:     atorvastatin (Lipitor) 10 mg tablet, 1 (one) time each day at the same time, Disp: , Rfl:     butalbital-acetaminophen-caff (Fioricet)  "-40 mg capsule, Take by mouth., Disp: , Rfl:     cetirizine (ZyrTEC) 10 mg tablet, take 1 tablet by mouth once daily if needed for allergies, Disp: , Rfl:     EpiPen 2-Damir 0.3 mg/0.3 mL injection syringe, as directed Injection, Disp: , Rfl:     fluticasone propionate (Xhance) 93 mcg/actuation aerosol breath activated, Administer into affected nostril(s)., Disp: , Rfl:     Fosamax 70 mg tablet, 1 tablet 30 minutes before the first food, beverage or medicine of the day with plain water Orally for 30 day(s), Disp: , Rfl:     levothyroxine (Synthroid, Levoxyl) 100 mcg tablet, once every 24 hours., Disp: , Rfl:     metroNIDAZOLE (Metrogel) 0.75 % (37.5mg/5 gram) vaginal gel, Insert 1 Applicatorful into the vagina., Disp: , Rfl:     pantoprazole (ProtoNix) 40 mg EC tablet, Take 1 tablet (40 mg) by mouth., Disp: , Rfl:     Vitals:  Visit Vitals  Ht 1.6 m (5' 3\")   Wt 73.5 kg (162 lb 1.6 oz)   BMI 28.71 kg/m²   Smoking Status Never   BSA 1.81 m²      Physical Exam:  CONSTITUTIONAL:  Vitals reviewed in nursing chart, well developed, well nourished.    RESPIRATION:  Breathing comfortably, no stridor.  CV:  No clubbing/cyanosis/edema in hands.  EYES:  EOM Intact, sclera normal.  NEURO:  Alert and oriented times 3, Cranial nerves 2-12 intact and symmetric bilaterally.  HEAD AND FACE:  Skin with no masses or lesions, sinuses nontender to palpation.  SALIVARY GLANDS:  Parotid and submandibular glands normal bilaterally.  EARS:  Normal external ears, external auditory canals, and TMs to otoscopy, normal hearing to whispered voice.  NOSE:  External nose midline, anterior rhinoscopy is normal with limited visualization to the anterior aspect of the interior turbinates (see nasal endoscopy).  ORAL CAVITY/OROPHARYNX/LIPS:  Normal mucous membranes, normal floor of mouth/tongue/OP, no masses or lesions are noted.  NECK/LYMPH:  No LAD, no thyroid masses.    SINONASAL ENDOSCOPY (CPT 93829): To better evaluate the patient's symptoms, " sinonasal endoscopy is indicated.  After discussion of risks and benefits, and topical decongestion and anesthesia,an endoscope was used to perform nasal endoscopy on each side.  A time out identifying the patient, the procedure, the location of the procedure and any concerns was performed prior to beginning the procedure.    Findings:  Examination of the right nasal cavity revealed some mucus draining from the right middle meatus.  Sphenoethmoid recess was normal.  Examination of the left nasal cavity revealed a very small amount of mucus draining from the middle meatus.  Sphenoethmoid recess was normal.    Results/Data:  I personally reviewed her CT sinus as listed above.    I reviewed an office note from Dr. Hays May 29, 2024.  This note outlined her significant medical therapy including multiple courses of prednisone as well as doxycycline.  She was prescribed Xhance.  I reviewed another note from April 17, 2024.  She was on Breo at the time and they had discussed the possibility of a biologic if her symptoms continue.  I reviewed another note from March 13, 2024 at which time she was given doxycycline and asked to begin dextromethorphan over-the-counter.    I personally reviewed the last note from Dr. Lagunas May 15, 2024.  She underwent a CT sinus before this evaluation and he did mention that her cough had been improving at that time.  She was asked to see me given that she is already been evaluated by pulmonology, GI, allergy for her cough.  I reviewed a note from April 16, 2024.  During that evaluation her larynx was visualized and there were no concerning findings.  At that time, they discussed some wheezing in her neck and coughing that she was describing.  I reviewed another note from February 29, 2024.  At that time it was mentioned that she had an elevated eosinophil count.  I reviewed a note from February 5, 2024 during which they discussed that she had a wheeze in her neck and that it could be an  extrathoracic cause.  A CT neck was recommended and they discussed getting spirometry.  The CT neck was not available for review but according to the note from February 29 this was normal outside of sinus disease.  According to the same note, she was evaluated through pulmonary without specific findings.    I reviewed the report of a modified barium swallow from December 9, 2021.    IMPRESSION:   1. Normal modified barium swallowing study.   2. Given the normal modified barium swallowing study the patient's symptoms may be secondary to gastroesophageal reflux or abnormal/delayed esophageal peristalsis. Consider a fluoroscopic esophagram study for further evaluation if symptoms persist.     Provider Impressions:  1.  Chronic sinusitis  2.  Throat clearing, coughing  3.  Rhinorrhea  4.  Headaches, migraines  5.  Reflux on proton pump inhibitor and H2 blocker  6.  Eosinophilia    Discussion:  Yamileth Rolon and I discussed her exam and symptoms together today.  We had a very comprehensive discussion about whether or not the findings on her imaging were the most significant  of her ongoing throat clearing and coughing.  At this point in time, I would hold off on consideration of endonasal surgery and we discussed a number of reasons for this today.    I recommend that she continue Xhance.  She seems to be having improvement on this and I would not want to alter this therapy currently.  She has used Astepro without significant benefit.  If we were going to make alterations to her intranasal medical therapy I would recommend a rinse based steroid such as mometasone 2 mg.    We discussed extrathoracic causes of noisy breathing.  We also discussed some events that she would have with coughing where she would feel like she could not breathe and turned blue.  I question whether or not she has paradoxical vocal fold motion as this would explain some of the symptoms.  We discussed some basics of this entity and I  strongly recommended evaluation with one of my voice partners to be evaluated.  If this is not specifically seen on exam, I am hopeful that speech-language pathology can discuss options with her in regard to medical management.    She has been evaluated by pulmonary and follows with GI.  If she meets with my voice partner and has ongoing symptoms I think it would be completely reasonable to consider surgical options.  We discussed risks of sinus surgery today.  Our focus was on the potential for persistence of symptoms if her cough is not being driven by a primary sinonasal issue particularly the fact that she denies recent postnasal drainage yet continues to cough.    The sinus surgery itself was discussed at length.  The risks, benefits, and alternatives were explained in detail which include but is not limited to: persistence of symptoms, pain, bleeding, smell loss or alteration, infection, need for nasal packing, double vision, vision loss, CSF leak requiring other procedures to repair, numbness of teeth/and or face, need for revision surgery, and unexpected risks.      She had discussed with Dr. Hays the possibility of a biologic and this is also something that she should consider seriously as this would provide benefit both in regard to asthma as well as chronic sinusitis (particularly in those with nasal polyposis).    I recommended follow-up with me as needed moving forward.  All questions were answered.    Between face-to-face contact, review of the medical record, and documentation I spent 65 minutes on this evaluation during the day of service.    Signature:  Mk Mccray MD

## 2024-07-23 ENCOUNTER — OFFICE VISIT (OUTPATIENT)
Dept: OTOLARYNGOLOGY | Facility: CLINIC | Age: 69
End: 2024-07-23
Payer: MEDICARE

## 2024-07-23 VITALS — TEMPERATURE: 97.2 F | HEIGHT: 62 IN | BODY MASS INDEX: 30.42 KG/M2 | WEIGHT: 165.3 LBS

## 2024-07-23 DIAGNOSIS — R05.3 CHRONIC COUGH: Primary | ICD-10-CM

## 2024-07-23 PROCEDURE — 1126F AMNT PAIN NOTED NONE PRSNT: CPT | Performed by: OTOLARYNGOLOGY

## 2024-07-23 PROCEDURE — 31575 DIAGNOSTIC LARYNGOSCOPY: CPT | Performed by: OTOLARYNGOLOGY

## 2024-07-23 PROCEDURE — 1036F TOBACCO NON-USER: CPT | Performed by: OTOLARYNGOLOGY

## 2024-07-23 PROCEDURE — 99214 OFFICE O/P EST MOD 30 MIN: CPT | Performed by: OTOLARYNGOLOGY

## 2024-07-23 PROCEDURE — 3008F BODY MASS INDEX DOCD: CPT | Performed by: OTOLARYNGOLOGY

## 2024-07-23 PROCEDURE — 1160F RVW MEDS BY RX/DR IN RCRD: CPT | Performed by: OTOLARYNGOLOGY

## 2024-07-23 PROCEDURE — 1159F MED LIST DOCD IN RCRD: CPT | Performed by: OTOLARYNGOLOGY

## 2024-07-23 ASSESSMENT — PAIN SCALES - GENERAL: PAINLEVEL: 0-NO PAIN

## 2024-07-23 NOTE — PROGRESS NOTES
Patient: Yamileth Rolon   MRN: [id number] YOB: 1955   Sex: female Age: 69 y.o.  Date of Service: 2024       ASSESSMENT AND PLAN  I discussed the findings with Yamileth Rolon and have recommended the followin. Chronic cough for 9 months, but has most recently been under good control with allergy and reflux optimization. No acute coughing episodes in the last month.  - We discussed the role for cough suppression therapy and mucous control strategies with SLP in order to reduce risk of coughing episodes.  - there may be a component of laryngeal hypersensitivity; however, given current control, no urgency to intervene with an SLN block at this time.  - we encouraged this patient to follow-up as needed in the event of refractory symptoms      CHIEF COMPLAINT  Chief Complaint   Patient presents with    Follow-up       HISTORY OF PRESENT ILLNESS  Yamileth Rolon is a 69 y.o. female referred by Mk Mccray MD for evaluation of throat clearing, cough, noisy breathing. Patient had initial onset of symptoms in October/2023 with unrelenting coughing spells that led to cyanotic episodes and difficulty catching breath. She also had episodes of noisy breathing that was suspected to be wheezing initially, but was ruled out by her pulmonologist. She has also seen allergy and rhinology, as well as GI to optimize sinus-related issues and reflux. She feels like her symptoms have since resolved, but she is very anxious that these symptoms will recur and she won't be able to get them under control again. She is now on BID PPI, BID Xhance. Her cough episodes are worst with hypersensitivity to cold and speaking too much. She denies any swallowing functions. Her  feels like her voice sounds nasal, which is far from her baseline.    ADDITIONAL HISTORY  Past Medical History  HTN, HLD, osteoporosis, asthma Surgical History  She has no past surgical history on file.   Social  "History  She reports that she has never smoked. She has never used smokeless tobacco. She reports that she does not drink alcohol and does not use drugs. Allergies  Bee venom protein (honey bee), Iodinated contrast media, Iodine, Wasp venom, Amoxicillin, Sulfacetamide sod-sulfur-urea, Penicillin, and Sulfa (sulfonamide antibiotics)     Family History  No family history on file.     REVIEW OF SYSTEMS  All 10 systems were reviewed and negative except for above.      PHYSICAL EXAM  ENT Physical Exam   GENERAL: Well-nourished and developed, alert and appropriate, no distress, voice [id number]  RESPIRATORY: Breathing quietly, no stridor  HEAD: Normocephalic atraumatic  FACE: Symmetric, no masses or lesions  EYES:  Pupils reactive, sclera clear, external ocular muscles intact, no nystagmus.    EARS:  Pinnae normal.   NOSE:  No anterior lesions, masses or polyps.  ORAL CAVITY/OROPHARYNX:  Buccal mucosa is moist without lesions or masses, tongue midline and palate elevates symmetrically. Tongue mobility intact. Large maxillary katarina bilaterally  NECK:  Soft. There is no lymphadenopathy or thyromegaly.    NEUROLOGIC:  Cranial nerves II-XII grossly intact.       Last Recorded Vitals  Temperature 36.2 °C (97.2 °F), temperature source Temporal, height 1.575 m (5' 2\"), weight 75 kg (165 lb 4.8 oz).      PROCEDURES  Laryngoscopy    Date/Time: 7/23/2024 2:01 PM    Performed by: Alina Barnard MD  Authorized by: Alina Barnard MD       Procedure Note: Flexible Nasolaryngoscopy  Verbal informed consent was obtained from the patient/patient's guardian. 4% lidocaine mixed with phenylephrine was prepared and dripped into the nose. It was placed in bilateral nares. Following an appropriate amount of time to allow for adequate anesthesia, a flexible fiberoptic nasolaryngoscope was placed into the patient's right naris. The nasal cavity, nasopharynx, oropharynx, hypopharynx, and all endolaryngeal structures were visualized and were normal " except as listed below. Significant findings included:  -thick mucoid secretions requiring multiple swallows for clearance  -mild-to-moderate hyperemic/edematous arytenoids  -slight anterior glottic gap  -slight paresis of R VC compared to left  -no tumors or masses  -widely patent airway    ---------------------------------------------------------------------  Alina Barnard MD, MAEd    [facility]  Department of Otolaryngology - Head and Neck Surgery  [facility]    The total time I spent in care of this patient today (excluding time spent on other billable services) is as follows:    Time Spent  Prep time on day of patient encounter: 10 minutes  Time spent directly with patient, family or caregiver: 20 minutes  Additional Time Spent on Patient Care Activities: 5 minutes  Documentation Time: 10 minutes  Other Time Spent: 0 minutes  Total: 45 minutes       I saw and evaluated the patient, participating in the key elements of the service.  I discussed the findings, assessment and plan with the resident and agree with resident’s findings and plan as documented in the resident’s note.  I was present and actively participated for the entirety of the procedure(s).

## 2024-08-19 ENCOUNTER — EVALUATION (OUTPATIENT)
Dept: SPEECH THERAPY | Facility: CLINIC | Age: 69
End: 2024-08-19
Payer: MEDICARE

## 2024-08-19 DIAGNOSIS — R05.3 CHRONIC COUGH: ICD-10-CM

## 2024-08-19 PROCEDURE — 92524 BEHAVRAL QUALIT ANALYS VOICE: CPT | Mod: GN | Performed by: STUDENT IN AN ORGANIZED HEALTH CARE EDUCATION/TRAINING PROGRAM

## 2024-08-19 PROCEDURE — 92507 TX SP LANG VOICE COMM INDIV: CPT | Mod: GN | Performed by: STUDENT IN AN ORGANIZED HEALTH CARE EDUCATION/TRAINING PROGRAM

## 2024-08-19 ASSESSMENT — PATIENT HEALTH QUESTIONNAIRE - PHQ9
1. LITTLE INTEREST OR PLEASURE IN DOING THINGS: NOT AT ALL
SUM OF ALL RESPONSES TO PHQ9 QUESTIONS 1 AND 2: 0
2. FEELING DOWN, DEPRESSED OR HOPELESS: NOT AT ALL

## 2024-08-19 ASSESSMENT — PAIN SCALES - GENERAL: PAINLEVEL_OUTOF10: 0 - NO PAIN

## 2024-08-19 ASSESSMENT — PAIN - FUNCTIONAL ASSESSMENT: PAIN_FUNCTIONAL_ASSESSMENT: 0-10

## 2024-08-19 NOTE — LETTER
August 20, 2024    Alina Barnard MD  [id number]  Department Of Otolaryngology  [facility] [address]    Patient: Yamileth Rolon   YOB: 1955   Date of Visit: 8/19/2024       Dear Alina Barnard MD  [id number]  Department [place] [address]    The attached plan of care is being sent to you because your patient’s medical reimbursement requires that you certify the plan of care. Your signature is required to allow uninterrupted insurance coverage.      You may indicate your approval by signing below and faxing this form back to us at Dept Fax: [phone].    Please call Dept: [phone] with any questions or concerns.    Thank you for this referral,        Aditi Arvizu CCC-SLP  [address] [place] [address]    Payer: Payor: MEDICARE / Plan: MEDICARE PART A AND B / Product Type: *No Product type* /                                                                         Date:     Dear ESVIN Batista,     Re: Ms. Yamileth Rolon, MRN:[id number]    I certify that I have reviewed the attached plan of care and it is medically necessary for Ms. Yamileth Rolon (1955) who is under my care.          ______________________________________                    _________________  Provider name and credentials                                           Date and time                                                                                      The following plan is in draft form.  Please refer to the current version for the most up-to-date information.                Plan of Care 8/20/24    Draft  Plan ID: [id number]               Participants as of 8/20/2024      Name Type Comments Contact Info    Alina Barnard MD Referring Provider  [phone]          Last Plan Note       Author: ESVIN Batista Status: Signed Last edited: 8/19/2024  9:15 AM         Speech-Language  "Pathology    Voice Evaluation    Patient Name: Yamileth Rolon  MRN: [id number]  Today's Date: 8/19/2024  Time Calculation  Start Time: 0916  Stop Time: 1005  Time Calculation (min): 49 min     Current Problem:  Patient Active Problem List   Diagnosis   • Chronic cough     Voice Evaluation Impression:  Patient presents with irritable larynx/chronic cough as identified by ENT. The patient appears to be an excellent candidate for therapy which will target vocal hygiene, mucus management and cough suppression strategies.    Contributing Factors:   Dysphonia Factors: Exposure to biological irritants (GERD/LPR) , sinus drainage and increased mucus production.     Subjective:   Pt was referred by Dr. Barnard who she saw in July 2024. The patient had completed a round of Prednisone in June 2024 which is the reason her coughing had been under control. On the evaluation this date, the patient was observed coughing several times in response to sinus drainage. She reported coughing, sinus and mucus symptoms and patterns were back. Patient had initial onset of symptoms in October/November 2023 with unrelenting coughing spells that led to cyanotic episodes and difficulty catching breath. She also had episodes of noisy breathing that was suspected to be wheezing initially, but was ruled out by her pulmonologist. She has also seen allergy and rhinology, as well as GI to optimize sinus-related issues and reflux. The patient reports flushing her sinuses 2-4x/day.     Voice Use Inventory:  Voice Use Inventory  Voice misuse/abuse: None  Exposure to Noise: No  Exposure to respiratory irritants: Yes  Consistent use of singing voice: No  Occupation relies on speaking voice: No    Perceptual Voice Features:  Intonation: reduced  Loudness: WFL  Nasal resonance:     Maximum Phonation Time: 11.3 seconds  Average pitch: 137 Hz  Min-Max pitch: 119-178 Hz  Avg volume: 68 dB  Diadochokinetic Rate: 9 repetitions of \"butter\" in 5 seconds (WFL = 10 " "reps)    Additional vocal characteristics noted included:  Throat clears/coughing     Voice quality based on the GRBAS scale: 0=absent; 1=mild; 2=moderate; 3=severe    stGstrstastdstest:st st1st Roughness: 0    Breathiness: 0    Asthenia: 0    Strain: 0    General Visit Information:  General Information  Living Environment: Home    Pain  Pain Assessment  Pain Assessment: 0-10  0-10 (Numeric) Pain Score: 0 - No pain    Voice Use Inventory:  Voice Use Inventory  Voice misuse/abuse: None  Exposure to Noise: No  Exposure to respiratory irritants: Yes  Consistent use of singing voice: No  Occupation relies on speaking voice: No    CSI  Cough Severity Index  My cough is worse when I lie down.: 3  My coughing problem causes me to restrict my personal and social life.: 3  I tend to avoid places because of my coughing problem.: 3  I feel embarassed because of my coughing problem.: 4  People ask \"What's wrong\" because I cough a lot.: 4  I run out of air when I cough.: 2  My coughing problem affects my voice.: 4  My coughing problem limits my physical activity.: 2  My coughing problem upsets me.: 4  People ask me if I am sick because I cough a lot.: 4  CSI Total Score: 33    Patient Self Assessment:  Patient Self Assessment  Daily water intake: Yes (Regularly throughout the day although patient endorses she could be drinking more)  Daily caffeine intake: Yes (Very limited; 2 caffiene drinks a week, migraine meds have caffiene)  Alcohol intake: No  Smoking history: No  Reflux history: Yes    Voice Plan of Care:  Voice Plan of Care  Frequency: 1-2 follow up visits  Duration: 4-6 weeks  Recommendations for therapeutic interventions: Vocal hygiene program, Irritable larynx retraining  Prognosis: Good  Discussed Plan of Care: Patient, Caregiver/Family, Patient/caregiver agreeable with Plan of Care    SHORT TERM GOALS (Established 8/19/24)  1. Patient will independently execute strategies for cough reduction 80-90% of the time, per therapeutic " observation and patient self-report.  2. Patient will increase understanding of sensory motor response patterns, in order to facilitate implementation of cough reduction strategies in 8 out of 10 opportunities.     Long-term goals:  Patient will demonstrate reduction in symptoms as evidenced by improved scores on VHI-10, RSI, and/or CSI following treatment.     Voice Treatment:  Voice Treatment   Individual(s) Educated: Patient, Spouse  Verbal Education: Risks/benefits of therapy, Exercises  Written Education Provided: Exercises  Response to Education: Verbalized understanding  Patient's Learning Preference(s): Printed materials  Patient/Caregiver Verbalized Understanding and Agreement: Yes    Instructed patient this date in:  Cough/throat clear reduction techniques (effortful swallow, RTB, Teresita, silent cough/clear) , [place] or warm water gargle technique to safely remove mucus , Vocal wellness strategies to reduce cough/throat clear triggers and phono trauma reflux , Lifestyle modifications     Clinician modeled all techniques and accurate patient follow through confirmed.  Handouts emailed/provided to facilitate optimal home carryover.    Outpatient Education:  Adult Outpatient Education  Individual(s) Educated: Patient, Spouse  Risk and Benefits Discussed with Patient/Caregiver/Other: yes  Patient/Caregiver Demonstrated Understanding: yes  Plan of Care Discussed and Agreed Upon: yes  Patient Response to Education: Patient/Caregiver Verbalized Understanding of Information

## 2024-08-19 NOTE — PROGRESS NOTES
"Speech-Language Pathology    Voice Evaluation    Patient Name: Yamileth Rolon  MRN: [id number]  Today's Date: 8/19/2024  Time Calculation  Start Time: 0916  Stop Time: 1005  Time Calculation (min): 49 min     Current Problem:  Patient Active Problem List   Diagnosis    Chronic cough     Voice Evaluation Impression:  Patient presents with irritable larynx/chronic cough as identified by ENT. The patient appears to be an excellent candidate for therapy which will target vocal hygiene, mucus management and cough suppression strategies.    Contributing Factors:   Dysphonia Factors: Exposure to biological irritants (GERD/LPR) , sinus drainage and increased mucus production.     Subjective:   Pt was referred by Dr. Barnard who she saw in July 2024. The patient had completed a round of Prednisone in June 2024 which is the reason her coughing had been under control. On the evaluation this date, the patient was observed coughing several times in response to sinus drainage. She reported coughing, sinus and mucus symptoms and patterns were back. Patient had initial onset of symptoms in October/November 2023 with unrelenting coughing spells that led to cyanotic episodes and difficulty catching breath. She also had episodes of noisy breathing that was suspected to be wheezing initially, but was ruled out by her pulmonologist. She has also seen allergy and rhinology, as well as GI to optimize sinus-related issues and reflux. The patient reports flushing her sinuses 2-4x/day.     Voice Use Inventory:  Voice Use Inventory  Voice misuse/abuse: None  Exposure to Noise: No  Exposure to respiratory irritants: Yes  Consistent use of singing voice: No  Occupation relies on speaking voice: No    Perceptual Voice Features:  Intonation: reduced  Loudness: WFL  Nasal resonance:     Maximum Phonation Time: 11.3 seconds  Average pitch: 137 Hz  Min-Max pitch: 119-178 Hz  Avg volume: 68 dB  Diadochokinetic Rate: 9 repetitions of \"butter\" in 5 " "seconds (WFL = 10 reps)    Additional vocal characteristics noted included:  Throat clears/coughing     Voice quality based on the GRBAS scale: 0=absent; 1=mild; 2=moderate; 3=severe    stGstrstastdstest:st st1st Roughness: 0    Breathiness: 0    Asthenia: 0    Strain: 0    General Visit Information:  General Information  Living Environment: Home    Pain  Pain Assessment  Pain Assessment: 0-10  0-10 (Numeric) Pain Score: 0 - No pain    Voice Use Inventory:  Voice Use Inventory  Voice misuse/abuse: None  Exposure to Noise: No  Exposure to respiratory irritants: Yes  Consistent use of singing voice: No  Occupation relies on speaking voice: No    CSI  Cough Severity Index  My cough is worse when I lie down.: 3  My coughing problem causes me to restrict my personal and social life.: 3  I tend to avoid places because of my coughing problem.: 3  I feel embarassed because of my coughing problem.: 4  People ask \"What's wrong\" because I cough a lot.: 4  I run out of air when I cough.: 2  My coughing problem affects my voice.: 4  My coughing problem limits my physical activity.: 2  My coughing problem upsets me.: 4  People ask me if I am sick because I cough a lot.: 4  CSI Total Score: 33    Patient Self Assessment:  Patient Self Assessment  Daily water intake: Yes (Regularly throughout the day although patient endorses she could be drinking more)  Daily caffeine intake: Yes (Very limited; 2 caffiene drinks a week, migraine meds have caffiene)  Alcohol intake: No  Smoking history: No  Reflux history: Yes    Voice Plan of Care:  Voice Plan of Care  Frequency: 1-2 follow up visits  Duration: 4-6 weeks  Recommendations for therapeutic interventions: Vocal hygiene program, Irritable larynx retraining  Prognosis: Good  Discussed Plan of Care: Patient, Caregiver/Family, Patient/caregiver agreeable with Plan of Care    SHORT TERM GOALS (Established 8/19/24)  1. Patient will independently execute strategies for cough reduction 80-90% of the time, " per therapeutic observation and patient self-report.  2. Patient will increase understanding of sensory motor response patterns, in order to facilitate implementation of cough reduction strategies in 8 out of 10 opportunities.     Long-term goals:  Patient will demonstrate reduction in symptoms as evidenced by improved scores on VHI-10, RSI, and/or CSI following treatment.     Voice Treatment:  Voice Treatment   Individual(s) Educated: Patient, Spouse  Verbal Education: Risks/benefits of therapy, Exercises  Written Education Provided: Exercises  Response to Education: Verbalized understanding  Patient's Learning Preference(s): Printed materials  Patient/Caregiver Verbalized Understanding and Agreement: Yes    Instructed patient this date in:  Cough/throat clear reduction techniques (effortful swallow, RTB, Teresita, silent cough/clear) , [place] or warm water gargle technique to safely remove mucus , Vocal wellness strategies to reduce cough/throat clear triggers and phono trauma reflux , Lifestyle modifications     Clinician modeled all techniques and accurate patient follow through confirmed.  Handouts emailed/provided to facilitate optimal home carryover.    Outpatient Education:  Adult Outpatient Education  Individual(s) Educated: Patient, Spouse  Risk and Benefits Discussed with Patient/Caregiver/Other: yes  Patient/Caregiver Demonstrated Understanding: yes  Plan of Care Discussed and Agreed Upon: yes  Patient Response to Education: Patient/Caregiver Verbalized Understanding of Information

## 2024-08-20 NOTE — PROGRESS NOTES
I certify that I have reviewed the attached plan of care and it is medically necessary for Ms. Yamileth Rolon (1955) who is under my care.    Alina Barnard MD  08/20/24

## 2024-09-06 LAB
ADD MANUAL DIFF: NO
ALANINE AMINOTRANSFERASE: 26 U/L (ref 14–59)
ALBUMIN GLOBULIN RATIO: 1.3
ALBUMIN LEVEL: 3.7 G/DL (ref 3.4–5)
ALKALINE PHOSPHATASE: 80 U/L (ref 46–116)
ANION GAP: 12.9
ASPARTATE AMINO TRANSFERASE: 21 U/L (ref 15–37)
BLOOD UREA NITROGEN: 16 MG/DL (ref 7–18)
CALCIUM: 9 MG/DL (ref 8.5–10.1)
CARBON DIOXIDE: 29.7 MMOL/L (ref 21–32)
CHLORIDE: 104 MMOL/L (ref 98–107)
CO2 BLD-SCNC: 29.7 MMOL/L (ref 21–32)
ESTIMATED GFR (AFRICAN AMERICA: >60 (ref 60–?)
ESTIMATED GFR (NON-AFRICAN AME: >60 (ref 60–?)
FERRITIN: 30 NG/ML (ref 8–252)
GLOBULIN: 2.9 G/DL
GLUCOSE BLD-MCNC: 103 MG/DL (ref 74–106)
HCT VFR BLD CALC: 38.9 % (ref 36–48)
HEMATOCRIT: 38.9 % (ref 36–48)
HEMOGLOBIN: 12.8 G/DL (ref 12–16)
IMMATURE GRANULOCYTES ABS AUTO: 0.02 10^3/UL (ref 0–0.03)
IMMATURE GRANULOCYTES PCT AUTO: 0.3 % (ref 0–0.5)
IRON: 71 UG/DL (ref 50–170)
LYMPHOCYTES  ABSOLUTE AUTO: 2 10^3/UL (ref 1.2–3.8)
MCV RBC: 91.7 FL (ref 81–99)
MEAN CORPUSCULAR HEMOGLOBIN: 30.2 PG (ref 26.7–34)
MEAN CORPUSCULAR HGB CONC: 32.9 G/DL (ref 29.9–35.2)
MEAN CORPUSCULAR VOLUME: 91.7 FL (ref 81–99)
PERCENT IRON SATURATION: 19.5 %
PLATELET # BLD: 350 10^3/UL (ref 150–450)
PLATELET COUNT: 350 10^3/UL (ref 150–450)
POTASSIUM SERPLBLD-SCNC: 3.6 MMOL/L (ref 3.5–5.1)
POTASSIUM: 3.6 MMOL/L (ref 3.5–5.1)
RED BLOOD COUNT: 4.24 10^6/UL (ref 4.2–5.4)
SODIUM BLD-SCNC: 143 MMOL/L (ref 136–145)
SODIUM: 143 MMOL/L (ref 136–145)
TOTAL IRON BINDING CAPACITY: 365 UG/DL (ref 250–450)
TOTAL PROTEIN: 6.6 G/DL (ref 6.4–8.2)
WBC # BLD: 7.1 10^3/UL (ref 4–11)
WHITE BLOOD COUNT: 7.1 10^3/UL (ref 4–11)

## 2024-09-10 ENCOUNTER — HOSPITAL ENCOUNTER (OUTPATIENT)
Dept: HOSPITAL 101 - HEMC | Age: 69
LOS: 20 days | Discharge: HOME | End: 2024-09-30
Payer: MEDICARE

## 2024-09-10 DIAGNOSIS — K21.9: ICD-10-CM

## 2024-09-10 DIAGNOSIS — K90.9: ICD-10-CM

## 2024-09-10 DIAGNOSIS — D50.9: Primary | ICD-10-CM

## 2024-09-10 DIAGNOSIS — D64.9: ICD-10-CM

## 2024-09-10 PROCEDURE — G0463 HOSPITAL OUTPT CLINIC VISIT: HCPCS

## 2024-09-10 PROCEDURE — 80053 COMPREHEN METABOLIC PANEL: CPT

## 2024-09-10 PROCEDURE — 85025 COMPLETE CBC W/AUTO DIFF WBC: CPT

## 2024-09-10 PROCEDURE — 83540 ASSAY OF IRON: CPT

## 2024-09-10 PROCEDURE — 36415 COLL VENOUS BLD VENIPUNCTURE: CPT

## 2024-09-10 PROCEDURE — 83550 IRON BINDING TEST: CPT

## 2024-09-10 PROCEDURE — 82728 ASSAY OF FERRITIN: CPT

## 2024-11-18 ENCOUNTER — HOSPITAL ENCOUNTER
Dept: HOSPITAL 101 - MAMMO | Age: 69
Discharge: HOME | End: 2024-11-18
Payer: MEDICARE

## 2024-11-18 DIAGNOSIS — Z80.3: ICD-10-CM

## 2024-11-18 DIAGNOSIS — Z80.8: ICD-10-CM

## 2024-11-18 DIAGNOSIS — Z12.31: Primary | ICD-10-CM

## 2024-11-18 PROCEDURE — 77067 SCR MAMMO BI INCL CAD: CPT

## 2024-11-18 PROCEDURE — 77063 BREAST TOMOSYNTHESIS BI: CPT

## 2024-12-03 ENCOUNTER — HOSPITAL ENCOUNTER
Dept: HOSPITAL 101 - LAB | Age: 69
Discharge: HOME | End: 2024-12-03
Payer: MEDICARE

## 2024-12-03 DIAGNOSIS — D64.9: ICD-10-CM

## 2024-12-03 DIAGNOSIS — E89.0: Primary | ICD-10-CM

## 2024-12-03 DIAGNOSIS — K90.9: ICD-10-CM

## 2024-12-03 DIAGNOSIS — D50.9: ICD-10-CM

## 2024-12-03 LAB
ADD MANUAL DIFF: NO
ALANINE AMINOTRANSFERASE: 30 U/L (ref 14–59)
ALBUMIN GLOBULIN RATIO: 1.4
ALBUMIN LEVEL: 3.8 G/DL (ref 3.4–5)
ALKALINE PHOSPHATASE: 81 U/L (ref 46–116)
ANION GAP: 12.3
ASPARTATE AMINO TRANSFERASE: 27 U/L (ref 15–37)
BLOOD UREA NITROGEN: 16 MG/DL (ref 7–18)
CALCIUM: 9 MG/DL (ref 8.5–10.1)
CARBON DIOXIDE: 29.2 MMOL/L (ref 21–32)
CHLORIDE: 108 MMOL/L (ref 98–107)
CO2 BLD-SCNC: 29.2 MMOL/L (ref 21–32)
ESTIMATED GFR (AFRICAN AMERICA: >60
ESTIMATED GFR (NON-AFRICAN AME: 55
FERRITIN: 35 NG/ML (ref 8–252)
GLOBULIN: 2.8 G/DL
GLUCOSE BLD-MCNC: 86 MG/DL (ref 74–106)
HCT VFR BLD CALC: 39 % (ref 36–48)
HEMATOCRIT: 39 % (ref 36–48)
HEMOGLOBIN: 13.1 G/DL (ref 12–16)
IMMATURE GRANULOCYTES ABS AUTO: 0.04 10^3/UL (ref 0–0.03)
IMMATURE GRANULOCYTES PCT AUTO: 0.7 % (ref 0–0.5)
IRON: 87 UG/DL (ref 50–170)
LYMPHOCYTES  ABSOLUTE AUTO: 1.5 10^3/UL (ref 1.2–3.8)
MCV RBC: 92.4 FL (ref 81–99)
MEAN CORPUSCULAR HEMOGLOBIN: 31 PG (ref 26.7–34)
MEAN CORPUSCULAR HGB CONC: 33.6 G/DL (ref 29.9–35.2)
MEAN CORPUSCULAR VOLUME: 92.4 FL (ref 81–99)
PERCENT IRON SATURATION: 22.8 %
PLATELET # BLD: 349 10^3/UL (ref 150–450)
PLATELET COUNT: 349 10^3/UL (ref 150–450)
POTASSIUM SERPLBLD-SCNC: 3.5 MMOL/L (ref 3.5–5.1)
POTASSIUM: 3.5 MMOL/L (ref 3.5–5.1)
RED BLOOD COUNT: 4.22 10^6/UL (ref 4.2–5.4)
SODIUM BLD-SCNC: 146 MMOL/L (ref 136–145)
SODIUM: 146 MMOL/L (ref 136–145)
THYROID STIMULATING HORMONE: 1.72 UIU/ML (ref 0.36–3.74)
TOTAL IRON BINDING CAPACITY: 382 UG/DL (ref 250–450)
TOTAL PROTEIN: 6.6 G/DL (ref 6.4–8.2)
WBC # BLD: 5.8 10^3/UL (ref 4–11)
WHITE BLOOD COUNT: 5.8 10^3/UL (ref 4–11)

## 2024-12-03 PROCEDURE — 84443 ASSAY THYROID STIM HORMONE: CPT

## 2024-12-03 PROCEDURE — 83540 ASSAY OF IRON: CPT

## 2024-12-03 PROCEDURE — 36415 COLL VENOUS BLD VENIPUNCTURE: CPT

## 2024-12-03 PROCEDURE — 85025 COMPLETE CBC W/AUTO DIFF WBC: CPT

## 2024-12-03 PROCEDURE — 83550 IRON BINDING TEST: CPT

## 2024-12-03 PROCEDURE — 82728 ASSAY OF FERRITIN: CPT

## 2024-12-03 PROCEDURE — 80053 COMPREHEN METABOLIC PANEL: CPT

## 2024-12-10 ENCOUNTER — HOSPITAL ENCOUNTER (OUTPATIENT)
Dept: HOSPITAL 101 - HEMC | Age: 69
Discharge: HOME | End: 2024-12-10
Payer: MEDICARE

## 2024-12-10 DIAGNOSIS — D64.9: ICD-10-CM

## 2024-12-10 DIAGNOSIS — K90.9: ICD-10-CM

## 2024-12-10 DIAGNOSIS — D50.9: Primary | ICD-10-CM

## 2024-12-10 DIAGNOSIS — R05.3: ICD-10-CM

## 2024-12-10 DIAGNOSIS — K21.9: ICD-10-CM

## 2024-12-10 PROCEDURE — G0463 HOSPITAL OUTPT CLINIC VISIT: HCPCS

## 2025-01-07 PROCEDURE — 93010 ELECTROCARDIOGRAM REPORT: CPT | Performed by: INTERNAL MEDICINE

## 2025-02-11 ENCOUNTER — HOSPITAL ENCOUNTER
Dept: HOSPITAL 101 - LAB | Age: 70
Discharge: HOME | End: 2025-02-11
Payer: MEDICARE

## 2025-02-11 DIAGNOSIS — D64.9: Primary | ICD-10-CM

## 2025-02-11 DIAGNOSIS — K90.9: ICD-10-CM

## 2025-02-11 DIAGNOSIS — D50.9: ICD-10-CM

## 2025-02-11 LAB
ADD MANUAL DIFF: NO
ALANINE AMINOTRANSFERASE: 22 U/L (ref 14–59)
ALBUMIN GLOBULIN RATIO: 1.2
ALBUMIN LEVEL: 3.8 G/DL (ref 3.4–5)
ALKALINE PHOSPHATASE: 96 U/L (ref 46–116)
ANION GAP: 11.9
ASPARTATE AMINO TRANSFERASE: 19 U/L (ref 15–37)
BLOOD UREA NITROGEN: 15 MG/DL (ref 7–18)
CALCIUM: 9 MG/DL (ref 8.5–10.1)
CARBON DIOXIDE: 30.2 MMOL/L (ref 21–32)
CHLORIDE: 105 MMOL/L (ref 98–107)
CO2 BLD-SCNC: 30.2 MMOL/L (ref 21–32)
ESTIMATED GFR (AFRICAN AMERICA: >60
ESTIMATED GFR (NON-AFRICAN AME: >60
FERRITIN: 25 NG/ML (ref 8–252)
GLOBULIN: 3.2 G/DL
GLUCOSE BLD-MCNC: 86 MG/DL (ref 74–106)
HCT VFR BLD CALC: 40.5 % (ref 36–48)
HEMATOCRIT: 40.5 % (ref 36–48)
HEMOGLOBIN: 13.2 G/DL (ref 12–16)
IMMATURE GRANULOCYTES ABS AUTO: 0.04 10^3/UL (ref 0–0.03)
IMMATURE GRANULOCYTES PCT AUTO: 0.5 % (ref 0–0.5)
IRON: 51 UG/DL (ref 50–170)
LYMPHOCYTES  ABSOLUTE AUTO: 1.7 10^3/UL (ref 1.2–3.8)
MCV RBC: 93.1 FL (ref 81–99)
MEAN CORPUSCULAR HEMOGLOBIN: 30.3 PG (ref 26.7–34)
MEAN CORPUSCULAR HGB CONC: 32.6 G/DL (ref 29.9–35.2)
MEAN CORPUSCULAR VOLUME: 93.1 FL (ref 81–99)
PERCENT IRON SATURATION: 13 %
PLATELET # BLD: 375 10^3/UL (ref 150–450)
PLATELET COUNT: 375 10^3/UL (ref 150–450)
POTASSIUM SERPLBLD-SCNC: 4.1 MMOL/L (ref 3.5–5.1)
POTASSIUM: 4.1 MMOL/L (ref 3.5–5.1)
RED BLOOD COUNT: 4.35 10^6/UL (ref 4.2–5.4)
SODIUM BLD-SCNC: 143 MMOL/L (ref 136–145)
SODIUM: 143 MMOL/L (ref 136–145)
TOTAL IRON BINDING CAPACITY: 392 UG/DL (ref 250–450)
TOTAL PROTEIN: 7 G/DL (ref 6.4–8.2)
WBC # BLD: 8.5 10^3/UL (ref 4–11)
WHITE BLOOD COUNT: 8.5 10^3/UL (ref 4–11)

## 2025-02-11 PROCEDURE — 82607 VITAMIN B-12: CPT

## 2025-02-11 PROCEDURE — 83550 IRON BINDING TEST: CPT

## 2025-02-11 PROCEDURE — 85025 COMPLETE CBC W/AUTO DIFF WBC: CPT

## 2025-02-11 PROCEDURE — 80053 COMPREHEN METABOLIC PANEL: CPT

## 2025-02-11 PROCEDURE — 83540 ASSAY OF IRON: CPT

## 2025-02-11 PROCEDURE — 36415 COLL VENOUS BLD VENIPUNCTURE: CPT

## 2025-02-11 PROCEDURE — 82306 VITAMIN D 25 HYDROXY: CPT

## 2025-02-11 PROCEDURE — 82728 ASSAY OF FERRITIN: CPT

## 2025-02-12 LAB — VITAMIN B12: 796 PG/ML (ref 232–1245)

## 2025-02-18 ENCOUNTER — HOSPITAL ENCOUNTER (OUTPATIENT)
Dept: HOSPITAL 101 - HEMC | Age: 70
LOS: 6 days | Discharge: HOME | End: 2025-02-24
Payer: MEDICARE

## 2025-02-18 DIAGNOSIS — Z98.51: ICD-10-CM

## 2025-02-18 DIAGNOSIS — D64.9: ICD-10-CM

## 2025-02-18 DIAGNOSIS — Z90.49: ICD-10-CM

## 2025-02-18 DIAGNOSIS — K90.9: ICD-10-CM

## 2025-02-18 DIAGNOSIS — D50.9: Primary | ICD-10-CM

## 2025-02-18 PROCEDURE — G0463 HOSPITAL OUTPT CLINIC VISIT: HCPCS

## 2025-03-03 VITALS
DIASTOLIC BLOOD PRESSURE: 71 MMHG | SYSTOLIC BLOOD PRESSURE: 124 MMHG | OXYGEN SATURATION: 98 % | TEMPERATURE: 98.2 F | HEART RATE: 71 BPM

## 2025-03-03 RX ADMIN — FERRIC CARBOXYMALTOSE INJECTION 795 MG: 50 INJECTION, SOLUTION INTRAVENOUS at 09:25

## 2025-03-12 ENCOUNTER — HOSPITAL ENCOUNTER (OUTPATIENT)
Dept: HOSPITAL 101 - HEMC | Age: 70
LOS: 12 days | Discharge: HOME | End: 2025-03-24
Payer: MEDICARE

## 2025-03-12 VITALS
DIASTOLIC BLOOD PRESSURE: 82 MMHG | SYSTOLIC BLOOD PRESSURE: 139 MMHG | TEMPERATURE: 97.88 F | HEART RATE: 75 BPM | OXYGEN SATURATION: 78 %

## 2025-03-12 DIAGNOSIS — D64.9: ICD-10-CM

## 2025-03-12 DIAGNOSIS — K90.9: ICD-10-CM

## 2025-03-12 DIAGNOSIS — D50.9: Primary | ICD-10-CM

## 2025-03-12 PROCEDURE — 96365 THER/PROPH/DIAG IV INF INIT: CPT

## 2025-03-12 RX ADMIN — FERRIC CARBOXYMALTOSE INJECTION 795 MG: 50 INJECTION, SOLUTION INTRAVENOUS at 08:09

## 2025-04-30 ENCOUNTER — HOSPITAL ENCOUNTER
Dept: HOSPITAL 101 - LAB | Age: 70
Discharge: HOME | End: 2025-04-30
Payer: MEDICARE

## 2025-04-30 DIAGNOSIS — K90.9: ICD-10-CM

## 2025-04-30 DIAGNOSIS — D50.9: ICD-10-CM

## 2025-04-30 DIAGNOSIS — D64.9: Primary | ICD-10-CM

## 2025-04-30 LAB
ADD MANUAL DIFF: NO
ANION GAP: 15.3
CHLORIDE: 107 MMOL/L (ref 98–107)
CO2 BLD-SCNC: 27.1 MMOL/L (ref 21–32)
ESTIMATED GFR (AFRICAN AMERICA: >60
ESTIMATED GFR (NON-AFRICAN AME: 58
GLUCOSE SERPLBLD-MCNC: 99 MG/DL (ref 74–106)
HEMOGLOBIN: 12.9 G/DL (ref 12–16)
IMMATURE GRANULOCYTES ABS AUTO: 0.02 10^3/UL (ref 0–0.03)
IMMATURE GRANULOCYTES PCT AUTO: 0.3 % (ref 0–0.5)
LYMPHOCYTES  ABSOLUTE AUTO: 2.2 10^3/UL (ref 1.2–3.8)
MCH RBC QN AUTO: 31.5 PG (ref 26.7–34)
MCV RBC: 92.9 FL (ref 81–99)
MEAN CORPUSCULAR HGB CONC: 33.9 G/DL (ref 29.9–35.2)
PERCENT IRON SATURATION: 47.1 %
PLATELET # BLD: 329 10^3/UL (ref 150–450)
POTASSIUM SERPLBLD-SCNC: 3.4 MMOL/L (ref 3.5–5.1)
RBC # BLD AUTO: 4.09 10^6/UL (ref 4.2–5.4)
SODIUM BLD-SCNC: 146 MMOL/L (ref 136–145)
WBC # BLD: 6.7 10^3/UL (ref 4–11)

## 2025-04-30 PROCEDURE — 83540 ASSAY OF IRON: CPT

## 2025-04-30 PROCEDURE — 82607 VITAMIN B-12: CPT

## 2025-04-30 PROCEDURE — 83550 IRON BINDING TEST: CPT

## 2025-04-30 PROCEDURE — 80048 BASIC METABOLIC PNL TOTAL CA: CPT

## 2025-04-30 PROCEDURE — 36415 COLL VENOUS BLD VENIPUNCTURE: CPT

## 2025-04-30 PROCEDURE — 85025 COMPLETE CBC W/AUTO DIFF WBC: CPT

## 2025-04-30 PROCEDURE — 82728 ASSAY OF FERRITIN: CPT

## 2025-04-30 PROCEDURE — 82306 VITAMIN D 25 HYDROXY: CPT

## 2025-05-01 LAB — VITAMIN B12: 737 PG/ML (ref 232–1245)

## 2025-05-06 ENCOUNTER — HOSPITAL ENCOUNTER (OUTPATIENT)
Dept: HOSPITAL 101 - HEMC | Age: 70
LOS: 1 days | Discharge: HOME | End: 2025-05-07
Payer: MEDICARE

## 2025-05-06 DIAGNOSIS — R05.3: ICD-10-CM

## 2025-05-06 DIAGNOSIS — K90.9: ICD-10-CM

## 2025-05-06 DIAGNOSIS — D50.9: Primary | ICD-10-CM

## 2025-05-06 DIAGNOSIS — Z90.49: ICD-10-CM

## 2025-05-06 DIAGNOSIS — K21.9: ICD-10-CM

## 2025-05-06 DIAGNOSIS — D64.9: ICD-10-CM

## 2025-05-06 PROCEDURE — G0463 HOSPITAL OUTPT CLINIC VISIT: HCPCS

## 2025-07-18 ENCOUNTER — HOSPITAL ENCOUNTER (EMERGENCY)
Age: 70
Discharge: HOME | End: 2025-07-18
Payer: MEDICARE

## 2025-07-18 VITALS — DIASTOLIC BLOOD PRESSURE: 70 MMHG | SYSTOLIC BLOOD PRESSURE: 130 MMHG | HEART RATE: 81 BPM | OXYGEN SATURATION: 96 %

## 2025-07-18 VITALS — BODY MASS INDEX: 27.4 KG/M2

## 2025-07-18 VITALS
HEART RATE: 101 BPM | TEMPERATURE: 99.68 F | OXYGEN SATURATION: 99 % | SYSTOLIC BLOOD PRESSURE: 135 MMHG | DIASTOLIC BLOOD PRESSURE: 77 MMHG

## 2025-07-18 DIAGNOSIS — R19.7: Primary | ICD-10-CM

## 2025-07-18 LAB
ADD MANUAL DIFF: NO
ANION GAP: 17
BASOPHILS # BLD AUTO: 0 10^3/UL (ref 0–0.1)
BASOPHILS NFR BLD AUTO: 0.2 % (ref 0.2–2)
BUN SERPL-MCNC: 12 MG/DL (ref 7–18)
BUN/CREAT SERPL: 14.8
C. DIFFICILE PCR: NEGATIVE
CALCIUM: 9 MG/DL (ref 8.5–10.1)
CHLORIDE: 106 MMOL/L (ref 98–107)
CO2 BLD-SCNC: 23.5 MMOL/L (ref 21–32)
CREAT SERPL-SCNC: 0.81 MG/DL (ref 0.55–1.02)
EOSINOPHIL # BLD AUTO: 0.1 10^3/UL (ref 0–0.7)
EOSINOPHIL NFR BLD AUTO: 1.1 % (ref 0.9–7)
ERYTHROCYTE [DISTWIDTH] IN BLOOD BY AUTOMATED COUNT: 12.5 % (ref 11–15)
ESTIMATED GFR (AFRICAN AMERICA: >60
ESTIMATED GFR (NON-AFRICAN AME: >60
GLUCOSE SERPLBLD-MCNC: 99 MG/DL (ref 74–106)
HCT VFR BLD CALC: 37.4 % (ref 36–48)
HEMOGLOBIN: 12.8 G/DL (ref 12–16)
IMMATURE GRANULOCYTES ABS AUTO: 0.04 10^3/UL (ref 0–0.03)
IMMATURE GRANULOCYTES PCT AUTO: 0.4 % (ref 0–0.5)
LYMPHOCYTES  ABSOLUTE AUTO: 1.1 10^3/UL (ref 1.2–3.8)
LYMPHOCYTES PERCENT AUTO: 11.2 % (ref 20.5–60)
MCH RBC QN AUTO: 32.2 PG (ref 26.7–34)
MCV RBC: 94.2 FL (ref 81–99)
MEAN CORPUSCULAR HGB CONC: 34.2 G/DL (ref 29.9–35.2)
MEAN PLATELET VOLUME: 9.6 FL (ref 9.5–13.5)
MONOCYTES # BLD AUTO: 1.1 10^3/UL (ref 0.3–0.8)
MONOCYTES NFR BLD AUTO: 11 % (ref 1.7–12)
NEUTROPHILS # BLD AUTO: 7.3 10^3/UL (ref 1.4–6.5)
NEUTROPHILS NFR BLD AUTO: 76.1 % (ref 43–75)
PLATELET # BLD: 299 10^3/UL (ref 150–450)
POTASSIUM SERPLBLD-SCNC: 3.5 MMOL/L (ref 3.5–5.1)
RBC # BLD AUTO: 3.97 10^6/UL (ref 4.2–5.4)
SODIUM BLD-SCNC: 143 MMOL/L (ref 136–145)
WBC # BLD: 9.6 10^3/UL (ref 4–11)

## 2025-07-18 PROCEDURE — 87493 C DIFF AMPLIFIED PROBE: CPT

## 2025-07-18 PROCEDURE — 99284 EMERGENCY DEPT VISIT MOD MDM: CPT

## 2025-07-18 PROCEDURE — 80048 BASIC METABOLIC PNL TOTAL CA: CPT

## 2025-07-18 PROCEDURE — 87045 FECES CULTURE AEROBIC BACT: CPT

## 2025-07-18 PROCEDURE — 87427 SHIGA-LIKE TOXIN AG IA: CPT

## 2025-07-18 PROCEDURE — 85025 COMPLETE CBC W/AUTO DIFF WBC: CPT

## 2025-07-18 PROCEDURE — 87046 STOOL CULTR AEROBIC BACT EA: CPT

## 2025-07-18 PROCEDURE — 36415 COLL VENOUS BLD VENIPUNCTURE: CPT

## 2025-07-18 PROCEDURE — 96360 HYDRATION IV INFUSION INIT: CPT
